# Patient Record
Sex: MALE | Race: WHITE | Employment: STUDENT | URBAN - METROPOLITAN AREA
[De-identification: names, ages, dates, MRNs, and addresses within clinical notes are randomized per-mention and may not be internally consistent; named-entity substitution may affect disease eponyms.]

---
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
## 2024-05-22 ENCOUNTER — LAB ENCOUNTER (OUTPATIENT)
Dept: LAB | Age: 19
End: 2024-05-22
Attending: NURSE PRACTITIONER

## 2024-05-22 DIAGNOSIS — E03.2 HYPOTHYROIDISM DUE TO MEDICATION: ICD-10-CM

## 2024-05-22 DIAGNOSIS — F41.1 GENERALIZED ANXIETY DISORDER: ICD-10-CM

## 2024-05-22 DIAGNOSIS — F39 UNSPECIFIED MOOD (AFFECTIVE) DISORDER (HCC): ICD-10-CM

## 2024-05-22 DIAGNOSIS — E55.9 VITAMIN D DEFICIENCY, UNSPECIFIED: ICD-10-CM

## 2024-05-22 DIAGNOSIS — F41.9 ANXIETY DISORDER, UNSPECIFIED TYPE: ICD-10-CM

## 2024-05-22 LAB
BASOPHILS # BLD AUTO: 0.05 X10(3) UL (ref 0–0.2)
BASOPHILS NFR BLD AUTO: 0.8 %
CARBAMAZEPINE SERPL-MCNC: 10.3 UG/ML (ref 4–12)
CHOLEST SERPL-MCNC: 228 MG/DL (ref ?–200)
EOSINOPHIL # BLD AUTO: 0.61 X10(3) UL (ref 0–0.7)
EOSINOPHIL NFR BLD AUTO: 9.2 %
ERYTHROCYTE [DISTWIDTH] IN BLOOD BY AUTOMATED COUNT: 12.5 %
EST. AVERAGE GLUCOSE BLD GHB EST-MCNC: 94 MG/DL (ref 68–126)
FASTING PATIENT LIPID ANSWER: YES
FOLATE SERPL-MCNC: 19.4 NG/ML (ref 8.7–?)
HBA1C MFR BLD: 4.9 % (ref ?–5.7)
HCT VFR BLD AUTO: 44.7 %
HDLC SERPL-MCNC: 58 MG/DL (ref 40–59)
HGB BLD-MCNC: 14.4 G/DL
IMM GRANULOCYTES # BLD AUTO: 0.02 X10(3) UL (ref 0–1)
IMM GRANULOCYTES NFR BLD: 0.3 %
LDLC SERPL CALC-MCNC: 151 MG/DL (ref ?–100)
LITHIUM SERPL-SCNC: 0.8 MMOL/L (ref 0.6–1.2)
LYMPHOCYTES # BLD AUTO: 1.47 X10(3) UL (ref 1.5–5)
LYMPHOCYTES NFR BLD AUTO: 22.3 %
MCH RBC QN AUTO: 29 PG (ref 26–34)
MCHC RBC AUTO-ENTMCNC: 32.2 G/DL (ref 31–37)
MCV RBC AUTO: 89.9 FL
MONOCYTES # BLD AUTO: 0.48 X10(3) UL (ref 0.1–1)
MONOCYTES NFR BLD AUTO: 7.3 %
NEUTROPHILS # BLD AUTO: 3.97 X10 (3) UL (ref 1.5–7.7)
NEUTROPHILS # BLD AUTO: 3.97 X10(3) UL (ref 1.5–7.7)
NEUTROPHILS NFR BLD AUTO: 60.1 %
NONHDLC SERPL-MCNC: 170 MG/DL (ref ?–130)
PLATELET # BLD AUTO: 284 10(3)UL (ref 150–450)
RBC # BLD AUTO: 4.97 X10(6)UL
T4 FREE SERPL-MCNC: 0.7 NG/DL (ref 0.9–1.6)
TRIGL SERPL-MCNC: 107 MG/DL (ref 30–149)
TSI SER-ACNC: 3.39 MIU/ML (ref 0.36–3.74)
VIT B12 SERPL-MCNC: 1425 PG/ML (ref 193–986)
VIT D+METAB SERPL-MCNC: 37.4 NG/ML (ref 30–100)
VLDLC SERPL CALC-MCNC: 20 MG/DL (ref 0–30)
WBC # BLD AUTO: 6.6 X10(3) UL (ref 4–11)

## 2024-05-22 PROCEDURE — 82306 VITAMIN D 25 HYDROXY: CPT

## 2024-05-22 PROCEDURE — 84439 ASSAY OF FREE THYROXINE: CPT

## 2024-05-22 PROCEDURE — 85025 COMPLETE CBC W/AUTO DIFF WBC: CPT

## 2024-05-22 PROCEDURE — 84443 ASSAY THYROID STIM HORMONE: CPT

## 2024-05-22 PROCEDURE — 82746 ASSAY OF FOLIC ACID SERUM: CPT

## 2024-05-22 PROCEDURE — 36415 COLL VENOUS BLD VENIPUNCTURE: CPT

## 2024-05-22 PROCEDURE — 80178 ASSAY OF LITHIUM: CPT

## 2024-05-22 PROCEDURE — 82607 VITAMIN B-12: CPT

## 2024-05-22 PROCEDURE — 80156 ASSAY CARBAMAZEPINE TOTAL: CPT

## 2024-05-22 PROCEDURE — 80061 LIPID PANEL: CPT

## 2024-05-22 PROCEDURE — 83036 HEMOGLOBIN GLYCOSYLATED A1C: CPT

## 2024-09-19 ENCOUNTER — LAB ENCOUNTER (OUTPATIENT)
Dept: LAB | Age: 19
End: 2024-09-19
Attending: NURSE PRACTITIONER
Payer: COMMERCIAL

## 2024-09-19 DIAGNOSIS — F31.9 BIPOLAR AFFECTIVE DISORDER, REMISSION STATUS UNSPECIFIED (HCC): ICD-10-CM

## 2024-09-19 LAB
ALBUMIN SERPL-MCNC: 4.8 G/DL (ref 3.2–4.8)
ALBUMIN/GLOB SERPL: 1.5 {RATIO} (ref 1–2)
ALP LIVER SERPL-CCNC: 79 U/L
ALT SERPL-CCNC: 17 U/L
ANION GAP SERPL CALC-SCNC: 2 MMOL/L (ref 0–18)
AST SERPL-CCNC: 18 U/L (ref ?–34)
BASOPHILS # BLD AUTO: 0.05 X10(3) UL (ref 0–0.2)
BASOPHILS NFR BLD AUTO: 0.7 %
BILIRUB SERPL-MCNC: 0.3 MG/DL (ref 0.3–1.2)
BUN BLD-MCNC: 7 MG/DL (ref 9–23)
CALCIUM BLD-MCNC: 10.7 MG/DL (ref 8.7–10.4)
CARBAMAZEPINE SERPL-MCNC: 11.5 UG/ML (ref 4–12)
CHLORIDE SERPL-SCNC: 103 MMOL/L (ref 98–112)
CO2 SERPL-SCNC: 30 MMOL/L (ref 21–32)
CREAT BLD-MCNC: 0.86 MG/DL
EGFRCR SERPLBLD CKD-EPI 2021: 128 ML/MIN/1.73M2 (ref 60–?)
EOSINOPHIL # BLD AUTO: 0.66 X10(3) UL (ref 0–0.7)
EOSINOPHIL NFR BLD AUTO: 9 %
ERYTHROCYTE [DISTWIDTH] IN BLOOD BY AUTOMATED COUNT: 12.4 %
FASTING STATUS PATIENT QL REPORTED: YES
FOLATE SERPL-MCNC: 28.8 NG/ML (ref 5.4–?)
GLOBULIN PLAS-MCNC: 3.1 G/DL (ref 2–3.5)
GLUCOSE BLD-MCNC: 96 MG/DL (ref 70–99)
HCT VFR BLD AUTO: 41.3 %
HGB BLD-MCNC: 13.2 G/DL
IMM GRANULOCYTES # BLD AUTO: 0.03 X10(3) UL (ref 0–1)
IMM GRANULOCYTES NFR BLD: 0.4 %
LITHIUM SERPL-SCNC: 1.4 MMOL/L (ref 0.6–1.2)
LYMPHOCYTES # BLD AUTO: 1.34 X10(3) UL (ref 1.5–5)
LYMPHOCYTES NFR BLD AUTO: 18.3 %
MCH RBC QN AUTO: 28.6 PG (ref 26–34)
MCHC RBC AUTO-ENTMCNC: 32 G/DL (ref 31–37)
MCV RBC AUTO: 89.6 FL
MONOCYTES # BLD AUTO: 0.64 X10(3) UL (ref 0.1–1)
MONOCYTES NFR BLD AUTO: 8.7 %
NEUTROPHILS # BLD AUTO: 4.61 X10 (3) UL (ref 1.5–7.7)
NEUTROPHILS # BLD AUTO: 4.61 X10(3) UL (ref 1.5–7.7)
NEUTROPHILS NFR BLD AUTO: 62.9 %
OSMOLALITY SERPL CALC.SUM OF ELEC: 278 MOSM/KG (ref 275–295)
PLATELET # BLD AUTO: 312 10(3)UL (ref 150–450)
POTASSIUM SERPL-SCNC: 4.4 MMOL/L (ref 3.5–5.1)
PROT SERPL-MCNC: 7.9 G/DL (ref 5.7–8.2)
RBC # BLD AUTO: 4.61 X10(6)UL
SODIUM SERPL-SCNC: 135 MMOL/L (ref 136–145)
T4 FREE SERPL-MCNC: 1.1 NG/DL (ref 0.9–1.6)
TSI SER-ACNC: 3.44 MIU/ML (ref 0.48–4.17)
VIT B12 SERPL-MCNC: 1762 PG/ML (ref 211–911)
VIT D+METAB SERPL-MCNC: 33.7 NG/ML (ref 30–100)
WBC # BLD AUTO: 7.3 X10(3) UL (ref 4–11)

## 2024-09-19 PROCEDURE — 85025 COMPLETE CBC W/AUTO DIFF WBC: CPT

## 2024-09-19 PROCEDURE — 36415 COLL VENOUS BLD VENIPUNCTURE: CPT

## 2024-09-19 PROCEDURE — 84439 ASSAY OF FREE THYROXINE: CPT

## 2024-09-19 PROCEDURE — 82306 VITAMIN D 25 HYDROXY: CPT

## 2024-09-19 PROCEDURE — 80053 COMPREHEN METABOLIC PANEL: CPT

## 2024-09-19 PROCEDURE — 82746 ASSAY OF FOLIC ACID SERUM: CPT

## 2024-09-19 PROCEDURE — 82607 VITAMIN B-12: CPT

## 2024-09-19 PROCEDURE — 80156 ASSAY CARBAMAZEPINE TOTAL: CPT

## 2024-09-19 PROCEDURE — 80178 ASSAY OF LITHIUM: CPT

## 2024-09-19 PROCEDURE — 84443 ASSAY THYROID STIM HORMONE: CPT

## 2024-09-20 NOTE — PROGRESS NOTES
Results reviewed. Please inform patient and pt's mom that his labs are abnormal; evan his lithium is elevated. Please find out if when he last took his medicines including lithium and tegretol; if he took them this morning before his labwork this afternoon? Thanks

## 2024-09-23 ENCOUNTER — LAB ENCOUNTER (OUTPATIENT)
Dept: LAB | Age: 19
End: 2024-09-23
Attending: NURSE PRACTITIONER
Payer: COMMERCIAL

## 2024-09-23 DIAGNOSIS — F31.62 BIPOLAR AFFECTIVE DISORDER, MIXED, MODERATE (HCC): ICD-10-CM

## 2024-09-23 LAB
CARBAMAZEPINE SERPL-MCNC: 10.1 UG/ML (ref 4–12)
LITHIUM SERPL-SCNC: 0.8 MMOL/L (ref 0.6–1.2)

## 2024-09-23 PROCEDURE — 80178 ASSAY OF LITHIUM: CPT

## 2024-09-23 PROCEDURE — 80156 ASSAY CARBAMAZEPINE TOTAL: CPT

## 2024-09-23 PROCEDURE — 36415 COLL VENOUS BLD VENIPUNCTURE: CPT

## 2024-12-14 ENCOUNTER — HOSPITAL ENCOUNTER (OUTPATIENT)
Age: 19
Discharge: HOME | End: 2024-12-14
Attending: EMERGENCY MEDICINE
Payer: COMMERCIAL

## 2024-12-14 VITALS
BODY MASS INDEX: 26.82 KG/M2 | RESPIRATION RATE: 20 BRPM | WEIGHT: 198 LBS | TEMPERATURE: 99 F | OXYGEN SATURATION: 97 % | HEART RATE: 78 BPM | HEIGHT: 72 IN | SYSTOLIC BLOOD PRESSURE: 141 MMHG | DIASTOLIC BLOOD PRESSURE: 84 MMHG

## 2024-12-14 DIAGNOSIS — I88.9 LYMPHADENITIS: Primary | ICD-10-CM

## 2024-12-14 DIAGNOSIS — J01.90 ACUTE NON-RECURRENT SINUSITIS, UNSPECIFIED LOCATION: ICD-10-CM

## 2024-12-14 LAB
POCT INFLUENZA A: NEGATIVE
POCT INFLUENZA B: NEGATIVE
S PYO AG THROAT QL IA.RAPID: NEGATIVE
SARS-COV-2 RNA RESP QL NAA+PROBE: NOT DETECTED

## 2024-12-14 PROCEDURE — 87502 INFLUENZA DNA AMP PROBE: CPT | Performed by: EMERGENCY MEDICINE

## 2024-12-14 PROCEDURE — 99214 OFFICE O/P EST MOD 30 MIN: CPT

## 2024-12-14 PROCEDURE — 87651 STREP A DNA AMP PROBE: CPT | Performed by: NURSE PRACTITIONER

## 2024-12-14 PROCEDURE — 99213 OFFICE O/P EST LOW 20 MIN: CPT

## 2024-12-14 RX ORDER — NAPROXEN 500 MG/1
500 TABLET ORAL 2 TIMES DAILY PRN
Qty: 20 TABLET | Refills: 0 | Status: SHIPPED | OUTPATIENT
Start: 2024-12-14

## 2024-12-14 RX ORDER — FLUTICASONE PROPIONATE 50 MCG
2 SPRAY, SUSPENSION (ML) NASAL DAILY
Qty: 16 G | Refills: 0 | Status: SHIPPED | OUTPATIENT
Start: 2024-12-14 | End: 2025-01-13

## 2024-12-14 RX ORDER — CETIRIZINE HYDROCHLORIDE 10 MG/1
10 TABLET ORAL DAILY
Qty: 10 TABLET | Refills: 0 | Status: SHIPPED | OUTPATIENT
Start: 2024-12-14 | End: 2024-12-24

## 2024-12-14 NOTE — DISCHARGE INSTRUCTIONS
Stay well hydrated.  Push fluids.  Humidifier in your bedroom.  You may benefit from using Afrin for the next 3 days as needed to help with congestion

## 2024-12-14 NOTE — ED PROVIDER NOTES
Patient Seen in: [facility]      History     Chief Complaint   Patient presents with    Sore Throat    Jaw Pain     Stated Complaint: Jaw pain    Subjective:   HPI      19-year-old male presents to the immediate care with complaints of jaw pain.  On further discussions actually an area underneath his jaw on the right side.  He has been having cough congestion and a sore throat as well.  No documented fevers.  No known exposure to anyone ill.  No vomiting or diarrhea.  No history of any direct trauma to his jaw.  No dental pain    Objective:     Past Medical History:    ADHD (attention deficit hyperactivity disorder)    Asthma ([facility])    Autism ([facility])    high functioning    Bipolar affective (HCC)    Deviated septum    Intentional overdose of drug in tablet form (HCC)    ODD (oppositional defiant disorder)    Overdose    Suicidal ideation              Past Surgical History:   Procedure Laterality Date    Adenoidectomy      Other      adnoidectomy                Social History     Socioeconomic History    Marital status: Single   Tobacco Use    Smoking status: Never    Smokeless tobacco: Never   Vaping Use    Vaping status: Some Days   Substance and Sexual Activity    Alcohol use: No    Drug use: Not Currently     Types: Cannabis     Comment: Sept 2020              Review of Systems   All other systems reviewed and are negative.      Positive for stated complaint: Jaw pain  Other systems are as noted in HPI.  Constitutional and vital signs reviewed.      All other systems reviewed and negative except as noted above.    Physical Exam     ED Triage Vitals [12/14/24 1339]   /84   Pulse 78   Resp 20   Temp 98.7 °F (37.1 °C)   Temp src Oral   SpO2 97 %   O2 Device None (Room air)       Current Vitals:   Vital Signs  BP: 141/84  Pulse: 78  Resp: 20  Temp: 98.7 °F (37.1 °C)  Temp src: Oral    Oxygen Therapy  SpO2: 97 %  O2 Device: None (Room air)        Physical Exam  Vitals and nursing note reviewed.    Constitutional:       General: He is not in acute distress.     Appearance: Normal appearance. He is well-developed.   HENT:      Head: Normocephalic and atraumatic.      Right Ear: Tympanic membrane normal.      Left Ear: Tympanic membrane normal.      Nose: Congestion and rhinorrhea present.      Mouth/Throat:      Pharynx: Posterior oropharyngeal erythema present. No oropharyngeal exudate.   Neck:      Comments: Tender lymph node submandibular on right  Cardiovascular:      Rate and Rhythm: Normal rate and regular rhythm.      Pulses: Normal pulses.      Heart sounds: Normal heart sounds.   Pulmonary:      Effort: Pulmonary effort is normal.      Breath sounds: No stridor. Rhonchi present.   Musculoskeletal:         General: Normal range of motion.      Cervical back: Normal range of motion and neck supple.   Lymphadenopathy:      Cervical: Cervical adenopathy present.   Skin:     General: Skin is warm and dry.   Neurological:      General: No focal deficit present.      Mental Status: He is alert and oriented to person, place, and time.            ED Course     Labs Reviewed   RAPID STREP A - Normal   POCT FLU TEST - Normal    Narrative:     This assay is a rapid molecular in vitro test utilizing nucleic acid amplification of influenza A and B viral RNA.   RAPID SARS-COV-2 BY PCR - Normal                   MDM      Patient seems to have more tenderness due to the submental node rather than any bony pain.  Certainly there could be a component of musculoskeletal discomfort.  He had a COVID and influenza that were negative as well as a negative strep.  He does have some sinus symptoms.  He most likely is having some discomfort with postnasal drip and activation of his lymph nodes.  Patient be given medicines to help with sinus pain and congestion as well as infection.  He was discharged in good condition        Medical Decision Making      Disposition and Plan     Clinical Impression:  1. Lymphadenitis    2.  Acute non-recurrent sinusitis, unspecified location         Disposition:  Discharge  12/14/2024  2:24 pm    Follow-up:  Jay Garcia  [address] [facility] [address]  [phone]    Schedule an appointment as soon as possible for a visit   As needed          Medications Prescribed:  Discharge Medication List as of 12/14/2024  2:25 PM        START taking these medications    Details   amoxicillin clavulanate 875-125 MG Oral Tab Take 1 tablet by mouth 2 (two) times daily for 10 days., Normal, Disp-20 tablet, R-0      fluticasone propionate 50 MCG/ACT Nasal Suspension 2 sprays by Nasal route daily., Normal, Disp-16 g, R-0      cetirizine 10 MG Oral Tab Take 1 tablet (10 mg total) by mouth daily for 10 days., Normal, Disp-10 tablet, R-0      naproxen 500 MG Oral Tab Take 1 tablet (500 mg total) by mouth 2 (two) times daily as needed., Normal, Disp-20 tablet, R-0                 Supplementary Documentation:

## 2025-05-13 ENCOUNTER — LAB ENCOUNTER (OUTPATIENT)
Dept: LAB | Age: 20
End: 2025-05-13
Attending: NURSE PRACTITIONER
Payer: COMMERCIAL

## 2025-05-13 DIAGNOSIS — F31.76 BIPOLAR DISORDER, IN FULL REMISSION, MOST RECENT EPISODE DEPRESSED (HCC): ICD-10-CM

## 2025-05-13 DIAGNOSIS — F84.0 AUTISM SPECTRUM DISORDER (HCC): ICD-10-CM

## 2025-05-13 LAB
ALBUMIN SERPL-MCNC: 5 G/DL (ref 3.2–4.8)
ALBUMIN/GLOB SERPL: 1.9 {RATIO} (ref 1–2)
ALP LIVER SERPL-CCNC: 87 U/L (ref 45–117)
ALT SERPL-CCNC: 33 U/L (ref 10–49)
ANION GAP SERPL CALC-SCNC: 9 MMOL/L (ref 0–18)
AST SERPL-CCNC: 26 U/L (ref ?–34)
BASOPHILS # BLD AUTO: 0.04 X10(3) UL (ref 0–0.2)
BASOPHILS NFR BLD AUTO: 0.5 %
BILIRUB SERPL-MCNC: 0.3 MG/DL (ref 0.3–1.2)
BUN BLD-MCNC: 8 MG/DL (ref 9–23)
CALCIUM BLD-MCNC: 10.2 MG/DL (ref 8.7–10.6)
CARBAMAZEPINE SERPL-MCNC: 9.4 UG/ML (ref 4–12)
CHLORIDE SERPL-SCNC: 105 MMOL/L (ref 98–112)
CHOLEST SERPL-MCNC: 219 MG/DL (ref ?–200)
CO2 SERPL-SCNC: 26 MMOL/L (ref 21–32)
CREAT BLD-MCNC: 0.98 MG/DL (ref 0.7–1.3)
EGFRCR SERPLBLD CKD-EPI 2021: 113 ML/MIN/1.73M2 (ref 60–?)
EOSINOPHIL # BLD AUTO: 0.74 X10(3) UL (ref 0–0.7)
EOSINOPHIL NFR BLD AUTO: 9.3 %
ERYTHROCYTE [DISTWIDTH] IN BLOOD BY AUTOMATED COUNT: 12.5 %
FASTING PATIENT LIPID ANSWER: YES
FASTING STATUS PATIENT QL REPORTED: YES
FOLATE SERPL-MCNC: 6.9 NG/ML (ref 5.4–?)
GLOBULIN PLAS-MCNC: 2.7 G/DL (ref 2–3.5)
GLUCOSE BLD-MCNC: 95 MG/DL (ref 70–99)
HCT VFR BLD AUTO: 41.1 % (ref 39–53)
HDLC SERPL-MCNC: 52 MG/DL (ref 40–59)
HGB BLD-MCNC: 12.9 G/DL (ref 13–17.5)
IMM GRANULOCYTES # BLD AUTO: 0.03 X10(3) UL (ref 0–1)
IMM GRANULOCYTES NFR BLD: 0.4 %
LDLC SERPL CALC-MCNC: 134 MG/DL (ref ?–100)
LITHIUM SERPL-SCNC: 1.1 MMOL/L (ref 0.6–1.2)
LYMPHOCYTES # BLD AUTO: 1.59 X10(3) UL (ref 1–4)
LYMPHOCYTES NFR BLD AUTO: 20 %
MCH RBC QN AUTO: 28.7 PG (ref 26–34)
MCHC RBC AUTO-ENTMCNC: 31.4 G/DL (ref 31–37)
MCV RBC AUTO: 91.5 FL (ref 80–100)
MONOCYTES # BLD AUTO: 0.69 X10(3) UL (ref 0.1–1)
MONOCYTES NFR BLD AUTO: 8.7 %
NEUTROPHILS # BLD AUTO: 4.85 X10 (3) UL (ref 1.5–7.7)
NEUTROPHILS # BLD AUTO: 4.85 X10(3) UL (ref 1.5–7.7)
NEUTROPHILS NFR BLD AUTO: 61.1 %
NONHDLC SERPL-MCNC: 167 MG/DL (ref ?–130)
OSMOLALITY SERPL CALC.SUM OF ELEC: 288 MOSM/KG (ref 275–295)
PLATELET # BLD AUTO: 288 10(3)UL (ref 150–450)
POTASSIUM SERPL-SCNC: 4.2 MMOL/L (ref 3.5–5.1)
PROT SERPL-MCNC: 7.7 G/DL (ref 5.7–8.2)
RBC # BLD AUTO: 4.49 X10(6)UL (ref 4.3–5.7)
SODIUM SERPL-SCNC: 140 MMOL/L (ref 136–145)
TRIGL SERPL-MCNC: 183 MG/DL (ref 30–149)
TSI SER-ACNC: 3.49 UIU/ML (ref 0.55–4.78)
VIT B12 SERPL-MCNC: 886 PG/ML (ref 211–911)
VIT D+METAB SERPL-MCNC: 27 NG/ML (ref 30–100)
VLDLC SERPL CALC-MCNC: 34 MG/DL (ref 0–30)
WBC # BLD AUTO: 7.9 X10(3) UL (ref 4–11)

## 2025-05-13 PROCEDURE — 85025 COMPLETE CBC W/AUTO DIFF WBC: CPT

## 2025-05-13 PROCEDURE — 84443 ASSAY THYROID STIM HORMONE: CPT

## 2025-05-13 PROCEDURE — 80053 COMPREHEN METABOLIC PANEL: CPT

## 2025-05-13 PROCEDURE — 80061 LIPID PANEL: CPT

## 2025-05-13 PROCEDURE — 80178 ASSAY OF LITHIUM: CPT

## 2025-05-13 PROCEDURE — 82607 VITAMIN B-12: CPT

## 2025-05-13 PROCEDURE — 80156 ASSAY CARBAMAZEPINE TOTAL: CPT

## 2025-05-13 PROCEDURE — 82306 VITAMIN D 25 HYDROXY: CPT

## 2025-05-13 PROCEDURE — 36415 COLL VENOUS BLD VENIPUNCTURE: CPT

## 2025-05-13 PROCEDURE — 82746 ASSAY OF FOLIC ACID SERUM: CPT

## (undated) NOTE — LETTER
VACCINE ADMINISTRATION RECORD  PARENT / GUARDIAN APPROVAL  Date: 2020  Vaccine administered to: Ben Kemp     : 10/12/2005    MRN: [id number]    A copy of the appropriate Centers for Disease Control and Prevention Vaccine Information statemen

## (undated) NOTE — ED AVS SNAPSHOT
Parent/Legal Guardian Access to the Online [employer] Record of a Patient 15to 16Years Old  Return completed form by Secure email to [facility] HIM/Medical Records Department: [email or web address] [email or web address].     Requirements and Procedures   Under [facility] ·  I understand that Vincent Beard is intended as a secure online source of confidential medical information.  If I share my [employer] ID and password with another person, that person may be able to view my or my child’s health information, and health information a · This form does not substitute as an Authorization to Release health information to a designated proxy by any other method. The purpose of this Minor Proxy form is for access to the [employer] portal information.     By signing below, I acknowledge that I ha I have read and understand the requirements and procedures for accessing my child’s medical record information online as provided  on page one of this document titled, Parent/Legal Guardian Access to the Online MyChart Record of a Patient [address] to [address]

## (undated) NOTE — LETTER
Date & Time: 10/11/2023, 12:10 PM  Patient: Aravind Louis  Encounter Provider(s):    FARIBA Clifford       To Whom It May Concern:    Anderson Yung was seen and treated in our department on 10/11/2023. He can return to work tomorrow if feeling better and if without fever (>100.4). If you have any questions or concerns, please do not hesitate to call.        _____________________________  Yue Shook.  JUSTIN Garcia, APRN, AGACNP-BC, FNP-C, CNL  Adult-Gerontology Acute Care & Family Nurse Practitioner

## (undated) NOTE — ED AVS SNAPSHOT
Kerri Ramirez   MRN: [id number]    Department:  [facility] Emergency Department   Date of Visit:  8/15/2017           Disclosure     Insurance plans vary and the physician(s) referred by the ER may not be covered by your plan.  Please contact yo If you have been prescribed any medication(s), please fill your prescription right away and begin taking the medication(s) as directed    If the emergency physician has read X-rays, these will be re-interpreted by a radiologist.  If there is a significant

## (undated) NOTE — ED AVS SNAPSHOT
Parent/Legal Guardian Access to the Online [employer] Record of a Patient 15to 16Years Old  Return completed form by Secure email to [facility] HIM/Medical Records Department: [email or web address]     Requirements and Procedures   Under [facility] MyChart ID and password with another person, that person may be able to view my or my child’s health information, and health information about someone who has authorized me as a MyChart proxy.    ·  I agree that it is my responsibility to select a confident Sign-Up Form and I agree to its terms.        Authorization Form     Please enter Patient’s information below:   Name (last, first, middle initial) __________________________________________   Gender  Male  Female    Last 4 Digits of Social Security Number Parent/Legal Guardian Signature                                  For Patient (1517 years of age)  I agree to allow my parent/legal guardian, named above, online access to my medical information currently available and that may become available as a result

## (undated) NOTE — IP AVS SNAPSHOT
Patient Demographics     Address  [place] [id number] Phone  [phone] [facility])  [phone] [facility] E-mail Address  [email or web address]      Emergency Contact(s)     Name Relation Home Work [address]  Mother [phone] [    ]   [    ]   [    ]     guanFACINE HCl 1 MG Tabs  Commonly known as:  TENEX      Take 1 tablet (1 mg total) by mouth nightly.    FARIBA Carvajal   [    ]   [    ]   [    ]   [    ]     lamoTRIgine 200 MG Tabs  Commonly known as:  [address] [place] [address] Pulse  71 Filed at 01/29/2020 1330   Resp  16 Filed at 01/29/2020 0830   Temp  98.2 °F (36.8 °C) Filed at 01/29/2020 0830   SpO2  97 % Filed at 01/29/2020 1330      Patient's Most Recent Weight       Most Recent Value   Patient Weight  61 kg (134 lb 7.7 oz Last use 1 week ago.[VB.2]     EMERGENCY DEPARTMENT COURSE:[VB.1]  Presented afebrile bit sleepy. Labs unremarkable. Poison control notified.  Initial HR in 70's and while in ER with sleeping in 40's thus admitted for further observation.[VB.2]         PAST IMMUNIZATIONS:[VB.1]  Up to date[VB. 2]   SOCIAL HISTORY:[VB.1]  Lives with parents[VB. 2]   FAMILY HISTORY:  family history includes Anxiety in his father and mother; Bipolar Disorder in his mother; Depression in his mother.     VITAL SIGNS:  /65   Pul Monitor for symptomatic peg, hypotension, respiratory depression. Poison control consultation via phone. Peds intensivist consultation.    Hold home meds till cleared by poison control.[VB.2]     Discussed patien'ts history of present illness, physica call me names\"[SC.2]  Reason for Admission/History of Present Illness:  Tamanna Sharp is a 12year old male who was admitted on 1/27/2020. [SC.1]  With a history of depression possible bipolar ADHD autism spectrum disorders oppositional defiant disorder is admitted because the parents found out and he did not touched cannabis since then. Patient complains that his older brother Eve Velasquez smokes cannabis he is 32 and he talks about cannabis and embellishes that therefore Analisa Tenorio hates his older brother.   Analisa Tenorio actually call very obsessive having trouble with change and insistence on the same this has been an issue. Patient's chronic excessive worries preoccupations and feeling irritable when things do not happen as expected.   He has been scoring consistently significant high when he did that. Patient is asking if he can get medical marijuana. [SC.2]  Past Psychiatric/Medication History:[SC.1]  Patient has been under the care of allergy initially with this physician starting 10/20/2013 has been diagnosed with mood disorder beha Patient reports that brother smokes cannabis. Patient's mother has been taking valproate successfully. .[SC.2]  Developmental/Social History:[SC.1]  Patient were full-term but had meconium aspiration and delayed milestones with verbal apraxia.   Patient has Thought process: logical and sequential  Thought content: preoccupations  Perceptions: no hallucinations  Consciousness/Orientation: Alert and oriented x 4  Concentration:   focused and attentive and as assessed by  ability to concentrate on our conversati Conflicts with the parents, with older sibling, with a friend who might of been his wannabe boyfriend and basically rejection from him.   Some academic stress but mostly social drama and parent-child conflicts.[SC.2]    Protective Factors:[SC.1]  Attachment Scheduled:[SC.1]  Will probably resume some of the medication once he is medically stable.[SC.2]  PRN:  influenza virus vaccine PF  Discontinued Medications: There are no discontinued medications.     Precautions:[SC.1]  Continue one-to-one sitter for suic Provider Marisol Armas    3/10/2020 3:30 PM Ishmael Shin, [address] [place] [address]      Multidisciplinary Problems     Active Goals        Problem: Patient/Family Goals    Goal Priority Disciplines Outcome Interventions   Patient/Fam